# Patient Record
Sex: FEMALE | Race: WHITE | NOT HISPANIC OR LATINO | Employment: UNEMPLOYED | URBAN - METROPOLITAN AREA
[De-identification: names, ages, dates, MRNs, and addresses within clinical notes are randomized per-mention and may not be internally consistent; named-entity substitution may affect disease eponyms.]

---
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
## 2025-07-29 ENCOUNTER — OFFICE VISIT (OUTPATIENT)
Dept: FAMILY MEDICINE CLINIC | Facility: CLINIC | Age: 20
End: 2025-07-29
Payer: COMMERCIAL

## 2025-07-29 ENCOUNTER — TELEPHONE (OUTPATIENT)
Dept: FAMILY MEDICINE CLINIC | Facility: CLINIC | Age: 20
End: 2025-07-29

## 2025-07-29 VITALS
WEIGHT: 122.4 LBS | TEMPERATURE: 97 F | HEIGHT: 65 IN | DIASTOLIC BLOOD PRESSURE: 58 MMHG | BODY MASS INDEX: 20.39 KG/M2 | SYSTOLIC BLOOD PRESSURE: 97 MMHG | HEART RATE: 85 BPM | RESPIRATION RATE: 16 BRPM | OXYGEN SATURATION: 99 %

## 2025-07-29 DIAGNOSIS — Z30.011 ENCOUNTER FOR INITIAL PRESCRIPTION OF CONTRACEPTIVE PILLS: ICD-10-CM

## 2025-07-29 DIAGNOSIS — Z00.00 ROUTINE GENERAL MEDICAL EXAMINATION AT A HEALTH CARE FACILITY: Primary | ICD-10-CM

## 2025-07-29 PROCEDURE — 99395 PREV VISIT EST AGE 18-39: CPT | Performed by: NURSE PRACTITIONER

## 2025-07-29 RX ORDER — DROSPIRENONE AND ETHINYL ESTRADIOL 0.02-3(28)
1 KIT ORAL DAILY
Qty: 30 TABLET | Refills: 30 | Status: SHIPPED | OUTPATIENT
Start: 2025-07-29

## 2025-07-29 NOTE — PROGRESS NOTES
Chief Complaint  Annual Exam and Menstrual Problem    Subjective        Tammi Blanche Garcia presents to [facility]  History of Present Illness    History of Present Illness  The patient presents for a physical exam. Mom is here also and has questions and history to share.     She reports experiencing irregular menstrual cycles, which she finds challenging. Her periods typically start on the 25th of each month, but since 11/2024, they have been starting on the first of the month. Despite this, she continues to experience premenstrual symptoms around the 25th. The duration and intensity of her periods remain consistent. For instance, her last period started on 07/02/2025, but she had been experiencing symptoms since 06/20/2025. She also mentions that her periods can be heavy, causing her to bleed through her sanitary products, which is inconvenient given her school and work schedule. She experiences one week without any period symptoms, but after that, she feels like it is constantly affecting her energy levels and mood. She has more bad weeks than good weeks now. She believes it is affecting her sleep as well. Her sister suggested it might be due to hormonal imbalance. She has discussed this with her therapist, but they have not delved deeper into it yet. She is curious about the potential side effects of birth control pills and whether they could affect her weight or cause acne. She is also concerned about the long-term effects of birth control pills on fertility. She is not interested in having a monthly period and is considering using birth control pills to manage her PMS. She recalls that her mother had similar menstrual issues before marriage. She is currently taking prenatal vitamins instead of regular vitamins. She is unsure if sickle cell anemia runs in her family. She is not interested in having blood work done today. She is not sexually active and does not have a boyfriend  or girlfriend. She has been under significant stress due to her periods, which she believes is impacting her sleep quality. She often wakes up around 3 or 4 AM and struggles to fall back asleep. She has been experiencing mood swings, which she attributes to her periods. She has been seeing a therapist biweekly for anxiety and depression.    She has been experiencing acne on her back, which worsens during her periods. She had previously seen a dermatologist during high school and was prescribed medication, which she discontinued after less than a year. The medication was effective while she was taking it.    She began experiencing headaches three weeks ago while in [place], which she initially attributed to her period. However, the headaches persisted even after her period ended. She suspects they may be due to lack of sleep. The headaches were constant for several weeks, often waking her up, but have since improved.    She maintains a healthy lifestyle, including regular exercise and a balanced diet. She does not consume alcohol, vape, or use tobacco. She is up to date on her vaccines and receives regular dental check-ups. She reports no chest pain, palpitations, shortness of breath, cough, urinary issues, abdominal pain, changes in stool characteristics, or blood in stool. She also reports no muscle aches or pains.  Denies ACHES  SEAN-7 Score: SEAN 7 Total Score: 3  PHQ-9 Total Score: 3    Diet: She maintains a balanced diet.  Alcohol: She does not consume alcohol.  Tobacco: She does not use tobacco.  Sleep: She often wakes up around 3 or 4 AM and struggles to fall back asleep.  Sexual Practices: She is not sexually active.    GYNECOLOGICAL HISTORY:  Age of Menarche: 11  Last Menstrual Period: 07/02/2025  Frequency and Flow: Heavy  Menstrual Pain: Yes    FAMILY HISTORY  She has a family history of thyroid issues.       Objective   Vital Signs:  BP 97/58   Pulse 85   Temp 97 °F (36.1 °C) (Infrared)   Resp 16   Ht  "165.1 cm (65\")   Wt 55.5 kg (122 lb 6.4 oz)   SpO2 99%   BMI 20.37 kg/m²   Estimated body mass index is 20.37 kg/m² as calculated from the following:    Height as of this encounter: 165.1 cm (65\").    Weight as of this encounter: 55.5 kg (122 lb 6.4 oz).  Facility age limit for growth %shamar is 20 years.    BMI is within normal parameters. No other follow-up for BMI required.      Physical Exam  Vitals and nursing note reviewed.   Constitutional:       General: She is not in acute distress.     Appearance: She is well-developed. She is not ill-appearing.   HENT:      Head: Normocephalic and atraumatic.      Right Ear: Tympanic membrane, ear canal and external ear normal.      Left Ear: Tympanic membrane, ear canal and external ear normal.      Mouth/Throat:      Mouth: Mucous membranes are moist.      Pharynx: Uvula midline. No posterior oropharyngeal erythema.   Eyes:      General: No scleral icterus.        Right eye: No discharge.         Left eye: No discharge.      Conjunctiva/sclera: Conjunctivae normal.      Pupils: Pupils are equal, round, and reactive to light.   Neck:      Thyroid: No thyromegaly.   Cardiovascular:      Rate and Rhythm: Normal rate and regular rhythm.      Heart sounds: Normal heart sounds. No murmur heard.  Pulmonary:      Effort: Pulmonary effort is normal.      Breath sounds: Normal breath sounds.   Abdominal:      General: Bowel sounds are normal. There is no distension.      Palpations: Abdomen is soft. There is no mass.      Tenderness: There is no abdominal tenderness. There is no guarding or rebound.      Hernia: No hernia is present.   Musculoskeletal:         General: No deformity.      Cervical back: Neck supple.      Comments: Gait smooth and steady   Lymphadenopathy:      Cervical: No cervical adenopathy.   Skin:     General: Skin is warm and dry.      Comments: Acne upper back   Neurological:      General: No focal deficit present.      Mental Status: She is alert and " oriented to person, place, and time.   Psychiatric:         Mood and Affect: Mood normal.         Behavior: Behavior normal.         Thought Content: Thought content normal.          Physical Exam       Result Review :            Results                  Assessment and Plan     Diagnoses and all orders for this visit:    1. Routine general medical examination at a health care facility (Primary)  -     CBC (No Diff)  -     Comprehensive Metabolic Panel  -     Iron Profile w/o Ferritin  -     TSH Rfx On Abnormal To Free T4    2. Encounter for initial prescription of contraceptive pills  -     drospirenone-ethinyl estradiol (YOVANNY) 3-0.02 MG per tablet; Take 1 tablet by mouth Daily.  Dispense: 30 tablet; Refill: 30        Assessment & Plan  1. Premenstrual syndrome (PMS).  - Symptoms include mood swings, depression, anxiety, and physical discomfort, which worsen around her menstrual cycle.  - normal menstrual flow and duration, but irregular timing.  - Discussed the use of combined estrogen and progesterone birth control pills to stabilize hormones and manage PMS symptoms. Advised on the potential side effects and the importance of consistent daily intake.  - Prescription for a low-dose birth control pill will be provided. Blood work will be ordered to assess iron levels, kidney and liver function, and thyroid status. Prenatal vitamins recommended if menstruating  -no ACHES    2. Acne.  - Acne worsens during menstrual cycle, indicating hormonal influence.  - Previous treatment with antibiotics was effective but discontinued.   - Discussed birth control pills as a potential treatment for hormonal acne. Advised that acne may return after discontinuing pills but is unlikely to worsen significantly.  - Prescription for birth control pills will be provided, which may help manage acne.    3. Tension headache.  - Headaches described as tension-related, possibly exacerbated by travel and weather changes.  - Headaches have  resolved, suggesting transient nature.  - No further treatment required at this time.  -not migrainous    4. Health maintenance.  - Up to date on vaccines?  DOT to obtain vaccine records  - Blood pressure is normal.  - Regular exercise and healthy eating habits noted.    Appropriate health maintenance and prevention topics specific for this patient were discussed today.  Additionally, health goals, and health concerns addressed as appropriate.  Pt was encouraged to stay up to date on recommended screenings and vaccines based on USPSTF guidelines.             Follow Up     Return in about 3 months (around 10/29/2025).  Patient was given instructions and counseling regarding her condition or for health maintenance advice. Please see specific information pulled into the AVS if appropriate.    Patient or patient representative verbalized consent for the use of Ambient Listening during the visit with  LILIANA Gomez for chart documentation. 7/29/2025  18:20 EDT

## 2025-07-30 LAB
ALBUMIN SERPL-MCNC: 4.5 G/DL (ref 3.5–5.2)
ALBUMIN/GLOB SERPL: 1.7 G/DL
ALP SERPL-CCNC: 72 U/L (ref 39–117)
ALT SERPL-CCNC: 7 U/L (ref 1–33)
AST SERPL-CCNC: 11 U/L (ref 1–32)
BILIRUB SERPL-MCNC: 0.3 MG/DL (ref 0–1.2)
BUN SERPL-MCNC: 7 MG/DL (ref 6–20)
BUN/CREAT SERPL: 8 (ref 7–25)
CALCIUM SERPL-MCNC: 9.6 MG/DL (ref 8.6–10.5)
CHLORIDE SERPL-SCNC: 104 MMOL/L (ref 98–107)
CO2 SERPL-SCNC: 24.4 MMOL/L (ref 22–29)
CREAT SERPL-MCNC: 0.87 MG/DL (ref 0.57–1)
EGFRCR SERPLBLD CKD-EPI 2021: 98 ML/MIN/1.73
ERYTHROCYTE [DISTWIDTH] IN BLOOD BY AUTOMATED COUNT: 13.8 % (ref 12.3–15.4)
GLOBULIN SER CALC-MCNC: 2.7 GM/DL
GLUCOSE SERPL-MCNC: 81 MG/DL (ref 65–99)
HCT VFR BLD AUTO: 38.6 % (ref 34–46.6)
HGB BLD-MCNC: 12.6 G/DL (ref 12–15.9)
IRON SATN MFR SERPL: 14 % (ref 20–50)
IRON SERPL-MCNC: 70 MCG/DL (ref 37–145)
MCH RBC QN AUTO: 28.4 PG (ref 26.6–33)
MCHC RBC AUTO-ENTMCNC: 32.6 G/DL (ref 31.5–35.7)
MCV RBC AUTO: 86.9 FL (ref 79–97)
PLATELET # BLD AUTO: 299 10*3/MM3 (ref 140–450)
POTASSIUM SERPL-SCNC: 3.8 MMOL/L (ref 3.5–5.2)
PROT SERPL-MCNC: 7.2 G/DL (ref 6–8.5)
RBC # BLD AUTO: 4.44 10*6/MM3 (ref 3.77–5.28)
SODIUM SERPL-SCNC: 138 MMOL/L (ref 136–145)
TIBC SERPL-MCNC: 489 MCG/DL
TSH SERPL DL<=0.005 MIU/L-ACNC: 0.9 UIU/ML (ref 0.27–4.2)
UIBC SERPL-MCNC: 419 MCG/DL (ref 112–346)
WBC # BLD AUTO: 7.49 10*3/MM3 (ref 3.4–10.8)

## 2025-08-19 DIAGNOSIS — Z30.011 ENCOUNTER FOR INITIAL PRESCRIPTION OF CONTRACEPTIVE PILLS: ICD-10-CM

## 2025-08-21 RX ORDER — DROSPIRENONE AND ETHINYL ESTRADIOL 0.02-3(28)
1 KIT ORAL DAILY
Qty: 90 TABLET | Refills: 1 | Status: SHIPPED | OUTPATIENT
Start: 2025-08-21